# Patient Record
Sex: FEMALE | Race: BLACK OR AFRICAN AMERICAN
[De-identification: names, ages, dates, MRNs, and addresses within clinical notes are randomized per-mention and may not be internally consistent; named-entity substitution may affect disease eponyms.]

---

## 2017-07-14 ENCOUNTER — HOSPITAL ENCOUNTER (OUTPATIENT)
Dept: HOSPITAL 82 - ENDO | Age: 67
Discharge: HOME | End: 2017-07-14
Attending: INTERNAL MEDICINE
Payer: MEDICARE

## 2017-07-14 VITALS — BODY MASS INDEX: 32.82 KG/M2 | HEIGHT: 65 IN | WEIGHT: 197 LBS

## 2017-07-14 VITALS — DIASTOLIC BLOOD PRESSURE: 56 MMHG | SYSTOLIC BLOOD PRESSURE: 112 MMHG

## 2017-07-14 DIAGNOSIS — R63.4: Primary | ICD-10-CM

## 2017-07-14 DIAGNOSIS — E11.9: ICD-10-CM

## 2017-07-14 DIAGNOSIS — I10: ICD-10-CM

## 2017-07-14 DIAGNOSIS — K57.30: ICD-10-CM

## 2017-07-14 DIAGNOSIS — D12.3: ICD-10-CM

## 2017-07-14 PROCEDURE — 0DBL8ZX EXCISION OF TRANSVERSE COLON, VIA NATURAL OR ARTIFICIAL OPENING ENDOSCOPIC, DIAGNOSTIC: ICD-10-PCS | Performed by: INTERNAL MEDICINE

## 2018-10-05 ENCOUNTER — HOSPITAL ENCOUNTER (OUTPATIENT)
Dept: HOSPITAL 82 - LAB | Age: 68
Discharge: HOME | End: 2018-10-05
Attending: INTERNAL MEDICINE
Payer: MEDICARE

## 2018-10-05 DIAGNOSIS — E11.65: Primary | ICD-10-CM

## 2018-11-22 ENCOUNTER — HOSPITAL ENCOUNTER (OUTPATIENT)
Dept: HOSPITAL 82 - ED | Age: 68
Setting detail: OBSERVATION
LOS: 2 days | Discharge: HOME | End: 2018-11-24
Attending: INTERNAL MEDICINE | Admitting: INTERNAL MEDICINE
Payer: MEDICARE

## 2018-11-22 VITALS — SYSTOLIC BLOOD PRESSURE: 167 MMHG | DIASTOLIC BLOOD PRESSURE: 80 MMHG

## 2018-11-22 VITALS — BODY MASS INDEX: 36.55 KG/M2 | HEIGHT: 65 IN | WEIGHT: 219.36 LBS

## 2018-11-22 DIAGNOSIS — E66.9: ICD-10-CM

## 2018-11-22 DIAGNOSIS — E11.65: ICD-10-CM

## 2018-11-22 DIAGNOSIS — E03.9: ICD-10-CM

## 2018-11-22 DIAGNOSIS — E78.5: ICD-10-CM

## 2018-11-22 DIAGNOSIS — N17.9: ICD-10-CM

## 2018-11-22 DIAGNOSIS — Z79.52: ICD-10-CM

## 2018-11-22 DIAGNOSIS — E11.22: ICD-10-CM

## 2018-11-22 DIAGNOSIS — K21.9: ICD-10-CM

## 2018-11-22 DIAGNOSIS — I12.9: ICD-10-CM

## 2018-11-22 DIAGNOSIS — R07.9: Primary | ICD-10-CM

## 2018-11-22 DIAGNOSIS — Z79.84: ICD-10-CM

## 2018-11-22 DIAGNOSIS — R79.89: ICD-10-CM

## 2018-11-22 DIAGNOSIS — Z86.011: ICD-10-CM

## 2018-11-22 DIAGNOSIS — N18.3: ICD-10-CM

## 2018-11-22 LAB
ALBUMIN SERPL-MCNC: 4 G/DL (ref 3.2–5)
ALP SERPL-CCNC: 138 U/L (ref 38–126)
AMYLASE SERPL-CCNC: 70 U/L (ref 30–110)
ANION GAP SERPL CALCULATED.3IONS-SCNC: 14 MMOL/L
AST SERPL-CCNC: 46 U/L (ref 9–36)
BASOPHILS NFR BLD AUTO: 0 % (ref 0–3)
BILIRUB UR QL STRIP.AUTO: NEGATIVE
BUN SERPL-MCNC: 26 MG/DL (ref 8–23)
BUN/CREAT SERPL: 15
CHLORIDE SERPL-SCNC: 102 MMOL/L (ref 95–108)
CLARITY UR: (no result)
CO2 SERPL-SCNC: 24 MMOL/L (ref 22–30)
COLOR UR AUTO: YELLOW
CREAT SERPL-MCNC: 1.7 MG/DL (ref 0.5–1)
EOSINOPHIL NFR BLD AUTO: 1 % (ref 0–8)
ERYTHROCYTE [DISTWIDTH] IN BLOOD BY AUTOMATED COUNT: 14.6 % (ref 11.5–15.5)
GLUCOSE UR STRIP.AUTO-MCNC: 250 MG/DL
HCT VFR BLD AUTO: 37.3 % (ref 37–47)
HGB BLD-MCNC: 12 G/DL (ref 12–16)
HGB UR QL STRIP.AUTO: NEGATIVE
IMM GRANULOCYTES NFR BLD: 0.3 % (ref 0–5)
KETONES UR STRIP.AUTO-MCNC: NEGATIVE MG/DL
LEUKOCYTE ESTERASE UR QL STRIP.AUTO: (no result)
LIPASE SERPL-CCNC: 364 U/L (ref 23–300)
LYMPHOCYTES NFR BLD: 39 % (ref 15–41)
MCH RBC QN AUTO: 25.8 PG  CALC (ref 26–32)
MCHC RBC AUTO-ENTMCNC: 32.2 G/L CALC (ref 32–36)
MCV RBC AUTO: 80 FL  CALC (ref 80–100)
MONOCYTES NFR BLD AUTO: 9 % (ref 2–13)
NEUTROPHILS # BLD AUTO: 4.7 THOU/UL (ref 2–7.15)
NEUTROPHILS NFR BLD AUTO: 50 % (ref 42–76)
NITRITE UR QL STRIP.AUTO: POSITIVE
PH UR STRIP.AUTO: 7 [PH] (ref 4.5–8)
PLATELET # BLD AUTO: 203 THOU/UL (ref 130–400)
POTASSIUM SERPL-SCNC: 3.7 MMOL/L (ref 3.5–5.1)
PROT SERPL-MCNC: 7.4 G/DL (ref 6.3–8.2)
PROT UR QL STRIP.AUTO: 100 MG/DL
RBC # BLD AUTO: 4.66 MILL/UL (ref 4.2–5.6)
RBC #/AREA URNS HPF: (no result) RBC/HPF (ref 0–5)
RENAL EPI CELLS URNS QL MICRO: (no result) HPF
SODIUM SERPL-SCNC: 136 MMOL/L (ref 137–146)
SP GR UR STRIP.AUTO: 1.02
SQUAMOUS URNS QL MICRO: (no result) EPI/HPF
UROBILINOGEN UR QL STRIP.AUTO: 0.2 E.U./DL
WBC #/AREA URNS HPF: (no result) WBC/HPF (ref 0–5)

## 2018-11-22 NOTE — NUR
PT ARRIVED TO FLOOR AT 1911 VIA STRETCHER WITH ER STAFF AND 2 FAMILY MEMBERS;
ALERT AND ORIENTED. AMBULATED TO BED INDEPENDENTLY. DENIES CHEST PAIN AT THIS
TIME.  RESPIRATIONS EVEN AND UNLABORED ON ROOM AIR. ORIENTED TO ROOM AND CALL
SYSTEM. PLAN OF CARE DISCUSSED. PT ENCOURAGED TO VERBALIZE CONCERNS. STATES
UNDERSTANDING. SAFETY MEASURES IN PLACE. CALL LIGHT WITHIN REACH.

## 2018-11-23 VITALS — DIASTOLIC BLOOD PRESSURE: 70 MMHG | SYSTOLIC BLOOD PRESSURE: 135 MMHG

## 2018-11-23 VITALS — DIASTOLIC BLOOD PRESSURE: 58 MMHG | SYSTOLIC BLOOD PRESSURE: 147 MMHG

## 2018-11-23 VITALS — DIASTOLIC BLOOD PRESSURE: 68 MMHG | SYSTOLIC BLOOD PRESSURE: 127 MMHG

## 2018-11-23 VITALS — SYSTOLIC BLOOD PRESSURE: 136 MMHG | DIASTOLIC BLOOD PRESSURE: 75 MMHG

## 2018-11-23 VITALS — SYSTOLIC BLOOD PRESSURE: 113 MMHG | DIASTOLIC BLOOD PRESSURE: 71 MMHG

## 2018-11-23 VITALS — DIASTOLIC BLOOD PRESSURE: 71 MMHG | SYSTOLIC BLOOD PRESSURE: 123 MMHG

## 2018-11-23 LAB
ANION GAP SERPL CALCULATED.3IONS-SCNC: 11 MMOL/L
BUN SERPL-MCNC: 21 MG/DL (ref 8–23)
BUN/CREAT SERPL: 15
CHLORIDE SERPL-SCNC: 103 MMOL/L (ref 95–108)
CHOLEST SERPL-MCNC: 125 MG/DL (ref 0–199)
CHOLEST/HDLC SERPL: 2.8 {RATIO}
CO2 SERPL-SCNC: 25 MMOL/L (ref 22–30)
CREAT SERPL-MCNC: 1.4 MG/DL (ref 0.5–1)
ERYTHROCYTE [DISTWIDTH] IN BLOOD BY AUTOMATED COUNT: 14.8 % (ref 11.5–15.5)
HCT VFR BLD AUTO: 35.6 % (ref 37–47)
HDLC SERPL-MCNC: 44 MG/DL (ref 40–?)
HGB BLD-MCNC: 11.6 G/DL (ref 12–16)
LDLC SERPL CALC-MCNC: 51 MG/DL
MAGNESIUM SERPL-MCNC: 1.7 MG/DL (ref 1.6–2.3)
MCH RBC QN AUTO: 26 PG  CALC (ref 26–32)
MCHC RBC AUTO-ENTMCNC: 32.6 G/L CALC (ref 32–36)
MCV RBC AUTO: 79.8 FL  CALC (ref 80–100)
PLATELET # BLD AUTO: 202 THOU/UL (ref 130–400)
POTASSIUM SERPL-SCNC: 3.7 MMOL/L (ref 3.5–5.1)
RBC # BLD AUTO: 4.46 MILL/UL (ref 4.2–5.6)
SODIUM SERPL-SCNC: 136 MMOL/L (ref 137–146)
TRIGL SERPL-MCNC: 148 MG/DL (ref 30–149)
VLDLC SERPL CALC-MCNC: 30 MG/DL

## 2018-11-23 NOTE — NUR
HS GLUCOSE 404; DR. BECKFORD NOTIFIED AND NEW ORDERS IN PLACE. INSULIN
ADMINISTERED ALONG WITH RESTORIL FOR SLEEP PER PT REQUEST. NO OTHER REQUESTS
OR CONCERNS AT THIS TIME.

## 2018-11-23 NOTE — NUR
SHIFT CHANGE REPORT FROM KATARINA, PT AWAKE ALERT AND ORIENTED, DENIED PAIN AT
THIS TIME, TELE MONITOR IN PLACE, IVF INFUSING, CALL BELL IN REACH.

## 2018-11-23 NOTE — NUR
BEDSIDE REPORT RECEIVED FROM JOE [place]. PT RESTING IN BED ON LEFT SIDE;
ALERT AND ORIENTED WITH GRANDDAUGHTER AT BEDSIDE. PT DENIES PAIN CURRENTLY.
RESPIRATIONS EVEN AND UNLABORED ON ROOM AIR. PLAN OF CARE REVIEWED. PT
ENCOURAGED TO VERBALIZE CONCERNS. STATES UNDERSTANDING. SAFETY MEASURES IN
PLACE. CALL LIGHT WITHIN REACH.

## 2018-11-23 NOTE — NUR
PT ASLEEP AT THIS TIME WITH NO SIGNS OF DISTRESS. RESPIRATIONS EVEN AND
UNLABORED ON ROOM AIR. IV FLUIDS INFUSING WITHOUT DIFFICULTY; IV SITE APPEARS
HEALTHY. PT IS INDEPENDENT IN ROOM. SAFETY MEASURES IN PLACE. CALL LIGHT
WITHIN REACH.

## 2018-11-23 NOTE — NUR
NO ACUTE CHANGES IN CONDITION SINCE ADMISSION TO UNIT. CONTINUES TO DENY CHEST
PAIN. AMBULATED INTO BATHROOM TO VOID. SAFETY MEASURES IN PLACE. CALL LIGHT
WITHIN REACH.

## 2018-11-24 VITALS — SYSTOLIC BLOOD PRESSURE: 142 MMHG | DIASTOLIC BLOOD PRESSURE: 49 MMHG

## 2018-11-24 VITALS — SYSTOLIC BLOOD PRESSURE: 139 MMHG | DIASTOLIC BLOOD PRESSURE: 86 MMHG

## 2018-11-24 VITALS — DIASTOLIC BLOOD PRESSURE: 59 MMHG | SYSTOLIC BLOOD PRESSURE: 139 MMHG

## 2018-11-24 VITALS — DIASTOLIC BLOOD PRESSURE: 64 MMHG | SYSTOLIC BLOOD PRESSURE: 117 MMHG

## 2018-11-24 LAB
ANION GAP SERPL CALCULATED.3IONS-SCNC: 7 MMOL/L
BUN SERPL-MCNC: 16 MG/DL (ref 8–23)
BUN/CREAT SERPL: 12
CHLORIDE SERPL-SCNC: 110 MMOL/L (ref 95–108)
CO2 SERPL-SCNC: 27 MMOL/L (ref 22–30)
CREAT SERPL-MCNC: 1.4 MG/DL (ref 0.5–1)
MAGNESIUM SERPL-MCNC: 1.6 MG/DL (ref 1.6–2.3)
POTASSIUM SERPL-SCNC: 4 MMOL/L (ref 3.5–5.1)
SODIUM SERPL-SCNC: 140 MMOL/L (ref 137–146)

## 2018-11-24 NOTE — NUR
SHIFT CHANGE REPORT FROM VU BRAY AWAKE AND ALERT RESTING IN BED, STATED
SHE HAD DIFFICULTY FALLING ASLEEP @ HS AND WAS GIVEN SLEEP AID BUT WAS
INTERRUPTED THROUGHOUT THE NIGHT BY HAVING TO URINATE FREQUENTLY. TELE MONITOR
IN PLACE, IVF INFUSING, WILL CONTINUE TO MONITOR, CALL BELL IN REACH.

## 2018-11-24 NOTE — NUR
Discharge instructions given. Patient verbalizes understanding of same.
Discharged in fair condition via Ambulatory to Home with
family. All belongings sent with pt.

## 2022-08-24 ENCOUNTER — HOSPITAL ENCOUNTER (OUTPATIENT)
Dept: HOSPITAL 82 - ORM | Age: 72
Discharge: HOME | End: 2022-08-24
Attending: PHYSICAL MEDICINE & REHABILITATION
Payer: MEDICARE

## 2022-08-24 VITALS — WEIGHT: 226 LBS | BODY MASS INDEX: 37.65 KG/M2 | HEIGHT: 65 IN

## 2022-08-24 VITALS — SYSTOLIC BLOOD PRESSURE: 139 MMHG | DIASTOLIC BLOOD PRESSURE: 82 MMHG

## 2022-08-24 DIAGNOSIS — M47.816: Primary | ICD-10-CM

## 2022-10-19 ENCOUNTER — HOSPITAL ENCOUNTER (OUTPATIENT)
Dept: HOSPITAL 82 - ORM | Age: 72
Discharge: HOME | End: 2022-10-19
Attending: PHYSICAL MEDICINE & REHABILITATION
Payer: MEDICARE

## 2022-10-19 VITALS — WEIGHT: 222 LBS | BODY MASS INDEX: 36.99 KG/M2 | HEIGHT: 65 IN

## 2022-10-19 VITALS — SYSTOLIC BLOOD PRESSURE: 127 MMHG | DIASTOLIC BLOOD PRESSURE: 79 MMHG

## 2022-10-19 DIAGNOSIS — M47.816: Primary | ICD-10-CM

## 2022-11-23 ENCOUNTER — HOSPITAL ENCOUNTER (OUTPATIENT)
Dept: HOSPITAL 82 - ORM | Age: 72
Discharge: HOME | End: 2022-11-23
Attending: PHYSICAL MEDICINE & REHABILITATION
Payer: MEDICARE

## 2022-11-23 VITALS — HEIGHT: 65 IN | WEIGHT: 220 LBS | BODY MASS INDEX: 36.65 KG/M2

## 2022-11-23 VITALS — SYSTOLIC BLOOD PRESSURE: 127 MMHG | DIASTOLIC BLOOD PRESSURE: 72 MMHG

## 2022-11-23 DIAGNOSIS — M47.816: Primary | ICD-10-CM

## 2023-01-30 ENCOUNTER — HOSPITAL ENCOUNTER (EMERGENCY)
Dept: HOSPITAL 82 - ED | Age: 73
Discharge: HOME | End: 2023-01-30
Payer: MEDICARE

## 2023-01-30 VITALS — DIASTOLIC BLOOD PRESSURE: 55 MMHG | SYSTOLIC BLOOD PRESSURE: 100 MMHG

## 2023-01-30 VITALS — SYSTOLIC BLOOD PRESSURE: 102 MMHG | DIASTOLIC BLOOD PRESSURE: 58 MMHG

## 2023-01-30 VITALS — DIASTOLIC BLOOD PRESSURE: 52 MMHG | SYSTOLIC BLOOD PRESSURE: 97 MMHG

## 2023-01-30 VITALS — SYSTOLIC BLOOD PRESSURE: 115 MMHG | DIASTOLIC BLOOD PRESSURE: 56 MMHG

## 2023-01-30 VITALS — SYSTOLIC BLOOD PRESSURE: 101 MMHG | DIASTOLIC BLOOD PRESSURE: 57 MMHG

## 2023-01-30 VITALS — WEIGHT: 237.88 LBS | HEIGHT: 65 IN | BODY MASS INDEX: 39.63 KG/M2

## 2023-01-30 DIAGNOSIS — E11.9: ICD-10-CM

## 2023-01-30 DIAGNOSIS — I10: ICD-10-CM

## 2023-01-30 DIAGNOSIS — M54.16: Primary | ICD-10-CM

## 2023-01-30 DIAGNOSIS — Z79.4: ICD-10-CM

## 2024-01-06 NOTE — NUR
PT UPDATED ON KNOWN RESULTS AND PLAN OF CARE.  PT AWARE OF PENDING CT SCAN. Yes - the patient is able to be screened

## 2025-02-24 ENCOUNTER — HOSPITAL ENCOUNTER (OUTPATIENT)
Dept: HOSPITAL 82 - ED | Age: 75
Setting detail: OBSERVATION
End: 2025-02-24
Attending: INTERNAL MEDICINE | Admitting: INTERNAL MEDICINE
Payer: MEDICARE

## 2025-02-24 VITALS — SYSTOLIC BLOOD PRESSURE: 111 MMHG | DIASTOLIC BLOOD PRESSURE: 50 MMHG

## 2025-02-24 VITALS — DIASTOLIC BLOOD PRESSURE: 51 MMHG | SYSTOLIC BLOOD PRESSURE: 106 MMHG

## 2025-02-24 VITALS — DIASTOLIC BLOOD PRESSURE: 60 MMHG | SYSTOLIC BLOOD PRESSURE: 111 MMHG

## 2025-02-24 VITALS — DIASTOLIC BLOOD PRESSURE: 55 MMHG | SYSTOLIC BLOOD PRESSURE: 96 MMHG

## 2025-02-24 VITALS — SYSTOLIC BLOOD PRESSURE: 108 MMHG | DIASTOLIC BLOOD PRESSURE: 54 MMHG

## 2025-02-24 VITALS — DIASTOLIC BLOOD PRESSURE: 87 MMHG | SYSTOLIC BLOOD PRESSURE: 109 MMHG

## 2025-02-24 VITALS — DIASTOLIC BLOOD PRESSURE: 51 MMHG | SYSTOLIC BLOOD PRESSURE: 89 MMHG

## 2025-02-24 VITALS — DIASTOLIC BLOOD PRESSURE: 59 MMHG | SYSTOLIC BLOOD PRESSURE: 114 MMHG

## 2025-02-24 VITALS — SYSTOLIC BLOOD PRESSURE: 86 MMHG | DIASTOLIC BLOOD PRESSURE: 67 MMHG

## 2025-02-24 VITALS — SYSTOLIC BLOOD PRESSURE: 104 MMHG | DIASTOLIC BLOOD PRESSURE: 51 MMHG

## 2025-02-24 VITALS — BODY MASS INDEX: 29.35 KG/M2 | WEIGHT: 176.15 LBS | HEIGHT: 65 IN

## 2025-02-24 VITALS — DIASTOLIC BLOOD PRESSURE: 68 MMHG | SYSTOLIC BLOOD PRESSURE: 115 MMHG

## 2025-02-24 VITALS — DIASTOLIC BLOOD PRESSURE: 55 MMHG | SYSTOLIC BLOOD PRESSURE: 98 MMHG

## 2025-02-24 VITALS — SYSTOLIC BLOOD PRESSURE: 104 MMHG | DIASTOLIC BLOOD PRESSURE: 62 MMHG

## 2025-02-24 VITALS — SYSTOLIC BLOOD PRESSURE: 107 MMHG | DIASTOLIC BLOOD PRESSURE: 65 MMHG

## 2025-02-24 VITALS — DIASTOLIC BLOOD PRESSURE: 47 MMHG | SYSTOLIC BLOOD PRESSURE: 113 MMHG

## 2025-02-24 VITALS — DIASTOLIC BLOOD PRESSURE: 58 MMHG | SYSTOLIC BLOOD PRESSURE: 99 MMHG

## 2025-02-24 VITALS — SYSTOLIC BLOOD PRESSURE: 94 MMHG | DIASTOLIC BLOOD PRESSURE: 49 MMHG

## 2025-02-24 VITALS — DIASTOLIC BLOOD PRESSURE: 56 MMHG | SYSTOLIC BLOOD PRESSURE: 96 MMHG

## 2025-02-24 VITALS — SYSTOLIC BLOOD PRESSURE: 120 MMHG | DIASTOLIC BLOOD PRESSURE: 70 MMHG

## 2025-02-24 VITALS — DIASTOLIC BLOOD PRESSURE: 43 MMHG | SYSTOLIC BLOOD PRESSURE: 86 MMHG

## 2025-02-24 VITALS — SYSTOLIC BLOOD PRESSURE: 83 MMHG | DIASTOLIC BLOOD PRESSURE: 65 MMHG

## 2025-02-24 VITALS — DIASTOLIC BLOOD PRESSURE: 54 MMHG | SYSTOLIC BLOOD PRESSURE: 95 MMHG

## 2025-02-24 VITALS — SYSTOLIC BLOOD PRESSURE: 90 MMHG | DIASTOLIC BLOOD PRESSURE: 52 MMHG

## 2025-02-24 VITALS — DIASTOLIC BLOOD PRESSURE: 63 MMHG | SYSTOLIC BLOOD PRESSURE: 110 MMHG

## 2025-02-24 VITALS — DIASTOLIC BLOOD PRESSURE: 92 MMHG | SYSTOLIC BLOOD PRESSURE: 128 MMHG

## 2025-02-24 VITALS — SYSTOLIC BLOOD PRESSURE: 100 MMHG | DIASTOLIC BLOOD PRESSURE: 60 MMHG

## 2025-02-24 VITALS — SYSTOLIC BLOOD PRESSURE: 89 MMHG | DIASTOLIC BLOOD PRESSURE: 68 MMHG

## 2025-02-24 VITALS — SYSTOLIC BLOOD PRESSURE: 103 MMHG | DIASTOLIC BLOOD PRESSURE: 67 MMHG

## 2025-02-24 VITALS — DIASTOLIC BLOOD PRESSURE: 52 MMHG | SYSTOLIC BLOOD PRESSURE: 91 MMHG

## 2025-02-24 VITALS — SYSTOLIC BLOOD PRESSURE: 121 MMHG | DIASTOLIC BLOOD PRESSURE: 69 MMHG

## 2025-02-24 VITALS — SYSTOLIC BLOOD PRESSURE: 85 MMHG | DIASTOLIC BLOOD PRESSURE: 61 MMHG

## 2025-02-24 VITALS — SYSTOLIC BLOOD PRESSURE: 103 MMHG | DIASTOLIC BLOOD PRESSURE: 62 MMHG

## 2025-02-24 VITALS — DIASTOLIC BLOOD PRESSURE: 51 MMHG | SYSTOLIC BLOOD PRESSURE: 92 MMHG

## 2025-02-24 VITALS — SYSTOLIC BLOOD PRESSURE: 118 MMHG | DIASTOLIC BLOOD PRESSURE: 70 MMHG

## 2025-02-24 VITALS — SYSTOLIC BLOOD PRESSURE: 122 MMHG | DIASTOLIC BLOOD PRESSURE: 55 MMHG

## 2025-02-24 VITALS — DIASTOLIC BLOOD PRESSURE: 56 MMHG | SYSTOLIC BLOOD PRESSURE: 101 MMHG

## 2025-02-24 VITALS — DIASTOLIC BLOOD PRESSURE: 25 MMHG | SYSTOLIC BLOOD PRESSURE: 80 MMHG

## 2025-02-24 DIAGNOSIS — E66.01: ICD-10-CM

## 2025-02-24 DIAGNOSIS — I46.9: ICD-10-CM

## 2025-02-24 DIAGNOSIS — K21.9: ICD-10-CM

## 2025-02-24 DIAGNOSIS — E03.9: ICD-10-CM

## 2025-02-24 DIAGNOSIS — K59.09: Primary | ICD-10-CM

## 2025-02-24 DIAGNOSIS — K92.0: ICD-10-CM

## 2025-02-24 DIAGNOSIS — E78.5: ICD-10-CM

## 2025-02-24 DIAGNOSIS — N18.9: ICD-10-CM

## 2025-02-24 DIAGNOSIS — I12.9: ICD-10-CM

## 2025-02-24 DIAGNOSIS — E11.22: ICD-10-CM

## 2025-02-24 DIAGNOSIS — Z79.4: ICD-10-CM

## 2025-02-24 LAB
ALBUMIN SERPL-MCNC: 2.3 G/DL (ref 3.2–5)
ALBUMIN SERPL-MCNC: 2.9 G/DL (ref 3.2–5)
ALBUMIN SERPL-MCNC: 4.3 G/DL (ref 3.2–5)
ALP SERPL-CCNC: 102 U/L (ref 38–126)
ALP SERPL-CCNC: 76 U/L (ref 38–126)
ALP SERPL-CCNC: 86 U/L (ref 38–126)
ANION GAP SERPL CALCULATED.3IONS-SCNC: 16 MMOL/L
ANION GAP SERPL CALCULATED.3IONS-SCNC: 16 MMOL/L
ANION GAP SERPL CALCULATED.3IONS-SCNC: 19 MMOL/L
AST SERPL-CCNC: 101 U/L (ref 9–36)
AST SERPL-CCNC: 213 U/L (ref 9–36)
AST SERPL-CCNC: 41 U/L (ref 9–36)
BASOPHILS NFR BLD AUTO: 0.2 % (ref 0–3)
BASOPHILS NFR BLD AUTO: 0.3 % (ref 0–3)
BUN SERPL-MCNC: 19 MG/DL (ref 8–23)
BUN SERPL-MCNC: 21 MG/DL (ref 8–23)
BUN SERPL-MCNC: 21 MG/DL (ref 8–23)
BUN/CREAT SERPL: 14
BUN/CREAT SERPL: 15
BUN/CREAT SERPL: 16
CHLORIDE SERPL-SCNC: 105 MMOL/L (ref 95–108)
CHLORIDE SERPL-SCNC: 108 MMOL/L (ref 95–108)
CHLORIDE SERPL-SCNC: 114 MMOL/L (ref 95–108)
CO2 SERPL-SCNC: 13 MMOL/L (ref 22–30)
CO2 SERPL-SCNC: 20 MMOL/L (ref 22–30)
CO2 SERPL-SCNC: 23 MMOL/L (ref 22–30)
CREAT SERPL-MCNC: 1.2 MG/DL (ref 0.5–1)
CREAT SERPL-MCNC: 1.5 MG/DL (ref 0.5–1)
CREAT SERPL-MCNC: 1.5 MG/DL (ref 0.5–1)
EOSINOPHIL NFR BLD AUTO: 0.3 % (ref 0–8)
EOSINOPHIL NFR BLD AUTO: 1 % (ref 0–8)
ERYTHROCYTE [DISTWIDTH] IN BLOOD BY AUTOMATED COUNT: 12.5 % (ref 11.5–15.5)
ERYTHROCYTE [DISTWIDTH] IN BLOOD BY AUTOMATED COUNT: 12.9 % (ref 11.5–15.5)
ERYTHROCYTE [DISTWIDTH] IN BLOOD BY AUTOMATED COUNT: 13.2 % (ref 11.5–15.5)
HCT VFR BLD AUTO: 39.9 % (ref 37–47)
HCT VFR BLD AUTO: 40.8 % (ref 37–47)
HCT VFR BLD AUTO: 43.4 % (ref 37–47)
HGB BLD-MCNC: 11.9 G/DL (ref 12–16)
HGB BLD-MCNC: 12.5 G/DL (ref 12–16)
HGB BLD-MCNC: 14.6 G/DL (ref 12–16)
IMM GRANULOCYTES NFR BLD: 0.3 % (ref 0–5)
IMM GRANULOCYTES NFR BLD: 0.3 % (ref 0–5)
LIPASE SERPL-CCNC: 213 U/L (ref 23–300)
LYMPHOCYTES NFR BLD: 26.6 % (ref 15–41)
LYMPHOCYTES NFR BLD: 60.1 % (ref 15–41)
MAGNESIUM SERPL-MCNC: 6.2 MG/DL (ref 1.6–2.3)
MCH RBC QN AUTO: 30.3 PG  CALC (ref 26–32)
MCH RBC QN AUTO: 30.7 PG  CALC (ref 26–32)
MCH RBC QN AUTO: 30.8 PG  CALC (ref 26–32)
MCHC RBC AUTO-ENTMCNC: 29.2 G/DL CAL (ref 32–36)
MCHC RBC AUTO-ENTMCNC: 31.3 G/DL CAL (ref 32–36)
MCHC RBC AUTO-ENTMCNC: 33.6 G/DL CAL (ref 32–36)
MCV RBC AUTO: 103.8 FL  CALC (ref 80–100)
MCV RBC AUTO: 91.6 FL  CALC (ref 80–100)
MCV RBC AUTO: 98 FL  CALC (ref 80–100)
MONOCYTES NFR BLD AUTO: 5 % (ref 2–13)
MONOCYTES NFR BLD AUTO: 5.8 % (ref 2–13)
NEUTROPHILS # BLD AUTO: 1.27 THOU/UL (ref 2–7.15)
NEUTROPHILS # BLD AUTO: 6.92 THOU/UL (ref 2–7.15)
NEUTROPHILS NFR BLD AUTO: 33.3 % (ref 42–76)
NEUTROPHILS NFR BLD AUTO: 66.8 % (ref 42–76)
PLATELET # BLD AUTO: 178 THOU/UL (ref 130–400)
PLATELET # BLD AUTO: 197 THOU/UL (ref 130–400)
PLATELET # BLD AUTO: 241 THOU/UL (ref 130–400)
POTASSIUM SERPL-SCNC: 2.9 MMOL/L (ref 3.5–5.1)
POTASSIUM SERPL-SCNC: 3.1 MMOL/L (ref 3.5–5.1)
POTASSIUM SERPL-SCNC: 3.6 MMOL/L (ref 3.5–5.1)
PROT SERPL-MCNC: 4.5 G/DL (ref 6.3–8.2)
PROT SERPL-MCNC: 5.2 G/DL (ref 6.3–8.2)
PROT SERPL-MCNC: 7.3 G/DL (ref 6.3–8.2)
RBC # BLD AUTO: 3.93 MILL/UL (ref 4.2–5.6)
RBC # BLD AUTO: 4.07 MILL/UL (ref 4.2–5.6)
RBC # BLD AUTO: 4.74 MILL/UL (ref 4.2–5.6)
SODIUM SERPL-SCNC: 141 MMOL/L (ref 137–146)
SODIUM SERPL-SCNC: 141 MMOL/L (ref 137–146)
SODIUM SERPL-SCNC: 143 MMOL/L (ref 137–146)

## 2025-02-24 PROCEDURE — 0BH17EZ INSERTION OF ENDOTRACHEAL AIRWAY INTO TRACHEA, VIA NATURAL OR ARTIFICIAL OPENING: ICD-10-PCS | Performed by: FAMILY MEDICINE

## 2025-02-24 PROCEDURE — 5A1935Z RESPIRATORY VENTILATION, LESS THAN 24 CONSECUTIVE HOURS: ICD-10-PCS | Performed by: FAMILY MEDICINE

## 2025-02-24 PROCEDURE — 06HY33Z INSERTION OF INFUSION DEVICE INTO LOWER VEIN, PERCUTANEOUS APPROACH: ICD-10-PCS | Performed by: FAMILY MEDICINE

## 2025-02-24 PROCEDURE — 3E033XZ INTRODUCTION OF VASOPRESSOR INTO PERIPHERAL VEIN, PERCUTANEOUS APPROACH: ICD-10-PCS | Performed by: FAMILY MEDICINE

## 2025-02-24 PROCEDURE — 5A12012 PERFORMANCE OF CARDIAC OUTPUT, SINGLE, MANUAL: ICD-10-PCS | Performed by: FAMILY MEDICINE
